# Patient Record
Sex: FEMALE | Race: WHITE | Employment: OTHER | URBAN - METROPOLITAN AREA
[De-identification: names, ages, dates, MRNs, and addresses within clinical notes are randomized per-mention and may not be internally consistent; named-entity substitution may affect disease eponyms.]

---

## 2017-04-14 PROBLEM — M16.12 PRIMARY OSTEOARTHRITIS OF LEFT HIP: Status: ACTIVE | Noted: 2017-04-14

## 2017-05-05 PROBLEM — G89.29 CHRONIC MIDLINE LOW BACK PAIN, WITH SCIATICA PRESENCE UNSPECIFIED: Status: ACTIVE | Noted: 2017-05-05

## 2017-05-05 PROBLEM — M54.5 CHRONIC MIDLINE LOW BACK PAIN, WITH SCIATICA PRESENCE UNSPECIFIED: Status: ACTIVE | Noted: 2017-05-05

## 2017-06-06 RX ORDER — BUTALBITAL, ACETAMINOPHEN AND CAFFEINE 50; 325; 40 MG/1; MG/1; MG/1
1 TABLET ORAL EVERY 6 HOURS PRN
COMMUNITY

## 2017-06-06 RX ORDER — NITROGLYCERIN 0.4 MG/1
0.4 TABLET SUBLINGUAL EVERY 5 MIN PRN
COMMUNITY

## 2017-06-06 RX ORDER — ACETAMINOPHEN 325 MG/1
325 TABLET ORAL EVERY 6 HOURS PRN
COMMUNITY

## 2017-06-06 RX ORDER — GLUCOSAMINE HCL 500 MG
TABLET ORAL
COMMUNITY

## 2017-06-06 RX ORDER — BUPRENORPHINE HCL 8 MG/1
1 TABLET SUBLINGUAL DAILY
COMMUNITY

## 2017-06-06 RX ORDER — SENNA AND DOCUSATE SODIUM 50; 8.6 MG/1; MG/1
2 TABLET, FILM COATED ORAL DAILY
COMMUNITY

## 2017-06-26 ENCOUNTER — APPOINTMENT (OUTPATIENT)
Dept: LAB | Facility: HOSPITAL | Age: 77
End: 2017-06-26
Attending: ORTHOPAEDIC SURGERY
Payer: MEDICARE

## 2017-06-26 ENCOUNTER — HOSPITAL ENCOUNTER (OUTPATIENT)
Dept: PHYSICAL THERAPY | Facility: HOSPITAL | Age: 77
Discharge: HOME | End: 2017-06-26
Attending: ORTHOPAEDIC SURGERY
Payer: MEDICARE

## 2017-06-26 DIAGNOSIS — Z01.818 PREOP EXAMINATION: Primary | ICD-10-CM

## 2017-06-26 DIAGNOSIS — M16.12 PRIMARY OSTEOARTHRITIS OF LEFT HIP: ICD-10-CM

## 2017-06-26 DIAGNOSIS — R73.01 IMPAIRED FASTING GLUCOSE: ICD-10-CM

## 2017-06-26 DIAGNOSIS — E66.09 EXOGENOUS OBESITY: ICD-10-CM

## 2017-06-26 DIAGNOSIS — E78.2 MIXED HYPERLIPIDEMIA: ICD-10-CM

## 2017-06-26 PROCEDURE — 86901 BLOOD TYPING SEROLOGIC RH(D): CPT

## 2017-06-26 PROCEDURE — 93005 ELECTROCARDIOGRAM TRACING: CPT

## 2017-06-26 PROCEDURE — 87081 CULTURE SCREEN ONLY: CPT

## 2017-06-26 PROCEDURE — 80053 COMPREHEN METABOLIC PANEL: CPT

## 2017-06-26 PROCEDURE — 80061 LIPID PANEL: CPT

## 2017-06-26 PROCEDURE — 84443 ASSAY THYROID STIM HORMONE: CPT

## 2017-06-26 PROCEDURE — 87088 URINE BACTERIA CULTURE: CPT

## 2017-06-26 PROCEDURE — 93010 ELECTROCARDIOGRAM REPORT: CPT | Performed by: INTERNAL MEDICINE

## 2017-06-26 PROCEDURE — 85025 COMPLETE CBC W/AUTO DIFF WBC: CPT

## 2017-06-26 PROCEDURE — 85610 PROTHROMBIN TIME: CPT

## 2017-06-26 PROCEDURE — 36415 COLL VENOUS BLD VENIPUNCTURE: CPT

## 2017-06-26 PROCEDURE — 87086 URINE CULTURE/COLONY COUNT: CPT

## 2017-06-26 PROCEDURE — 87186 SC STD MICRODIL/AGAR DIL: CPT

## 2017-06-26 PROCEDURE — 83036 HEMOGLOBIN GLYCOSYLATED A1C: CPT

## 2017-06-26 PROCEDURE — 85730 THROMBOPLASTIN TIME PARTIAL: CPT

## 2017-06-26 PROCEDURE — 86900 BLOOD TYPING SEROLOGIC ABO: CPT

## 2017-06-26 PROCEDURE — 81001 URINALYSIS AUTO W/SCOPE: CPT

## 2017-06-26 PROCEDURE — 86850 RBC ANTIBODY SCREEN: CPT

## 2017-07-09 ENCOUNTER — ANESTHESIA EVENT (OUTPATIENT)
Dept: SURGERY | Facility: HOSPITAL | Age: 77
DRG: 470 | End: 2017-07-09
Payer: MEDICARE

## 2017-07-10 ENCOUNTER — HOSPITAL ENCOUNTER (INPATIENT)
Facility: HOSPITAL | Age: 77
LOS: 2 days | Discharge: HOME HEALTH | DRG: 470 | End: 2017-07-12
Attending: ORTHOPAEDIC SURGERY | Admitting: ORTHOPAEDIC SURGERY
Payer: MEDICARE

## 2017-07-10 ENCOUNTER — SURGERY (OUTPATIENT)
Age: 77
End: 2017-07-10

## 2017-07-10 ENCOUNTER — ANESTHESIA (OUTPATIENT)
Dept: SURGERY | Facility: HOSPITAL | Age: 77
DRG: 470 | End: 2017-07-10
Payer: MEDICARE

## 2017-07-10 ENCOUNTER — APPOINTMENT (OUTPATIENT)
Dept: GENERAL RADIOLOGY | Facility: HOSPITAL | Age: 77
DRG: 470 | End: 2017-07-10
Attending: ORTHOPAEDIC SURGERY
Payer: MEDICARE

## 2017-07-10 DIAGNOSIS — M16.12 PRIMARY OSTEOARTHRITIS OF LEFT HIP: Primary | ICD-10-CM

## 2017-07-10 LAB
BILIRUB UR QL STRIP.AUTO: NEGATIVE
CLARITY UR REFRACT.AUTO: CLEAR
COLOR UR AUTO: YELLOW
GLUCOSE UR STRIP.AUTO-MCNC: NEGATIVE MG/DL
HYALINE CASTS #/AREA URNS AUTO: PRESENT /LPF
KETONES UR STRIP.AUTO-MCNC: NEGATIVE MG/DL
NITRITE UR QL STRIP.AUTO: NEGATIVE
PH UR STRIP.AUTO: 5 [PH] (ref 4.5–8)
PROT UR STRIP.AUTO-MCNC: NEGATIVE MG/DL
RBC UR QL AUTO: NEGATIVE
SP GR UR STRIP.AUTO: 1.02 (ref 1–1.03)
UROBILINOGEN UR STRIP.AUTO-MCNC: <2 MG/DL

## 2017-07-10 PROCEDURE — 73501 X-RAY EXAM HIP UNI 1 VIEW: CPT | Performed by: ORTHOPAEDIC SURGERY

## 2017-07-10 PROCEDURE — 88311 DECALCIFY TISSUE: CPT | Performed by: ORTHOPAEDIC SURGERY

## 2017-07-10 PROCEDURE — 87147 CULTURE TYPE IMMUNOLOGIC: CPT | Performed by: HOSPITALIST

## 2017-07-10 PROCEDURE — 76942 ECHO GUIDE FOR BIOPSY: CPT | Performed by: ORTHOPAEDIC SURGERY

## 2017-07-10 PROCEDURE — 97161 PT EVAL LOW COMPLEX 20 MIN: CPT

## 2017-07-10 PROCEDURE — 0SRB03Z REPLACEMENT OF LEFT HIP JOINT WITH CERAMIC SYNTHETIC SUBSTITUTE, OPEN APPROACH: ICD-10-PCS | Performed by: ORTHOPAEDIC SURGERY

## 2017-07-10 PROCEDURE — 88304 TISSUE EXAM BY PATHOLOGIST: CPT | Performed by: ORTHOPAEDIC SURGERY

## 2017-07-10 PROCEDURE — 94660 CPAP INITIATION&MGMT: CPT

## 2017-07-10 PROCEDURE — 97530 THERAPEUTIC ACTIVITIES: CPT

## 2017-07-10 PROCEDURE — 81001 URINALYSIS AUTO W/SCOPE: CPT | Performed by: HOSPITALIST

## 2017-07-10 PROCEDURE — 3E0T3CZ INTRODUCTION OF REGIONAL ANESTHETIC INTO PERIPHERAL NERVES AND PLEXI, PERCUTANEOUS APPROACH: ICD-10-PCS | Performed by: ANESTHESIOLOGY

## 2017-07-10 PROCEDURE — 87086 URINE CULTURE/COLONY COUNT: CPT | Performed by: HOSPITALIST

## 2017-07-10 DEVICE — PINNACLE HIP SOLUTIONS ALTRX POLYETHYLENE ACETABULAR LINER NEUTRAL 36MM ID 54MM OD
Type: IMPLANTABLE DEVICE | Site: HIP | Status: FUNCTIONAL
Brand: PINNACLE ALTRX

## 2017-07-10 DEVICE — BIOLOX DELTA CERAMIC FEMORAL HEAD +5.0 36MM DIA 12/14 TAPER
Type: IMPLANTABLE DEVICE | Site: HIP | Status: FUNCTIONAL
Brand: BIOLOX DELTA

## 2017-07-10 DEVICE — CORAIL HIP SYSTEM CEMENTLESS FEMORAL STEM 12/14 AMT 135 DEGREES KHO SIZE 11 HA COATED HIGH OFFSET NO COLLAR
Type: IMPLANTABLE DEVICE | Site: HIP | Status: FUNCTIONAL
Brand: CORAIL

## 2017-07-10 DEVICE — PINNACLE POROCOAT ACETABULAR SHELL SECTOR II 54MM OD
Type: IMPLANTABLE DEVICE | Site: HIP | Status: FUNCTIONAL
Brand: PINNACLE POROCOAT

## 2017-07-10 RX ORDER — HYDROMORPHONE HYDROCHLORIDE 1 MG/ML
0.4 INJECTION, SOLUTION INTRAMUSCULAR; INTRAVENOUS; SUBCUTANEOUS EVERY 2 HOUR PRN
Status: ACTIVE | OUTPATIENT
Start: 2017-07-10 | End: 2017-07-12

## 2017-07-10 RX ORDER — SODIUM PHOSPHATE, DIBASIC AND SODIUM PHOSPHATE, MONOBASIC 7; 19 G/133ML; G/133ML
1 ENEMA RECTAL ONCE AS NEEDED
Status: DISCONTINUED | OUTPATIENT
Start: 2017-07-10 | End: 2017-07-12

## 2017-07-10 RX ORDER — TRAMADOL HYDROCHLORIDE 50 MG/1
50 TABLET ORAL EVERY 6 HOURS
Status: DISCONTINUED | OUTPATIENT
Start: 2017-07-10 | End: 2017-07-10

## 2017-07-10 RX ORDER — SENNOSIDES 8.6 MG
17.2 TABLET ORAL NIGHTLY
Status: DISCONTINUED | OUTPATIENT
Start: 2017-07-10 | End: 2017-07-12

## 2017-07-10 RX ORDER — OXYCODONE HYDROCHLORIDE 5 MG/1
2.5 TABLET ORAL EVERY 4 HOURS PRN
Status: ACTIVE | OUTPATIENT
Start: 2017-07-10 | End: 2017-07-12

## 2017-07-10 RX ORDER — TRAMADOL HYDROCHLORIDE 50 MG/1
50 TABLET ORAL EVERY 6 HOURS PRN
Status: DISCONTINUED | OUTPATIENT
Start: 2017-07-10 | End: 2017-07-11

## 2017-07-10 RX ORDER — CLINDAMYCIN PHOSPHATE 900 MG/50ML
900 INJECTION INTRAVENOUS EVERY 8 HOURS
Status: COMPLETED | OUTPATIENT
Start: 2017-07-10 | End: 2017-07-10

## 2017-07-10 RX ORDER — ESCITALOPRAM OXALATE 20 MG/1
20 TABLET ORAL DAILY
Status: DISCONTINUED | OUTPATIENT
Start: 2017-07-10 | End: 2017-07-12

## 2017-07-10 RX ORDER — HYDROMORPHONE HYDROCHLORIDE 1 MG/ML
INJECTION, SOLUTION INTRAMUSCULAR; INTRAVENOUS; SUBCUTANEOUS
Status: COMPLETED
Start: 2017-07-10 | End: 2017-07-10

## 2017-07-10 RX ORDER — ACETAMINOPHEN 325 MG/1
650 TABLET ORAL 4 TIMES DAILY
Status: DISCONTINUED | OUTPATIENT
Start: 2017-07-10 | End: 2017-07-12

## 2017-07-10 RX ORDER — CYCLOBENZAPRINE HCL 5 MG
5 TABLET ORAL 3 TIMES DAILY PRN
Status: DISCONTINUED | OUTPATIENT
Start: 2017-07-10 | End: 2017-07-12

## 2017-07-10 RX ORDER — ONDANSETRON 2 MG/ML
4 INJECTION INTRAMUSCULAR; INTRAVENOUS EVERY 4 HOURS PRN
Status: DISCONTINUED | OUTPATIENT
Start: 2017-07-10 | End: 2017-07-12

## 2017-07-10 RX ORDER — OXYCODONE HCL 10 MG/1
10 TABLET, FILM COATED, EXTENDED RELEASE ORAL
Status: COMPLETED | OUTPATIENT
Start: 2017-07-10 | End: 2017-07-10

## 2017-07-10 RX ORDER — CLINDAMYCIN PHOSPHATE 900 MG/50ML
INJECTION INTRAVENOUS
Status: DISCONTINUED | OUTPATIENT
Start: 2017-07-10 | End: 2017-07-10

## 2017-07-10 RX ORDER — BISACODYL 10 MG
10 SUPPOSITORY, RECTAL RECTAL
Status: DISCONTINUED | OUTPATIENT
Start: 2017-07-10 | End: 2017-07-12

## 2017-07-10 RX ORDER — DIPHENHYDRAMINE HYDROCHLORIDE 50 MG/ML
12.5 INJECTION INTRAMUSCULAR; INTRAVENOUS EVERY 4 HOURS PRN
Status: DISCONTINUED | OUTPATIENT
Start: 2017-07-10 | End: 2017-07-11

## 2017-07-10 RX ORDER — METOCLOPRAMIDE HYDROCHLORIDE 5 MG/ML
10 INJECTION INTRAMUSCULAR; INTRAVENOUS EVERY 6 HOURS PRN
Status: ACTIVE | OUTPATIENT
Start: 2017-07-10 | End: 2017-07-12

## 2017-07-10 RX ORDER — MIDAZOLAM HYDROCHLORIDE 1 MG/ML
1 INJECTION INTRAMUSCULAR; INTRAVENOUS EVERY 5 MIN PRN
Status: DISCONTINUED | OUTPATIENT
Start: 2017-07-10 | End: 2017-07-10 | Stop reason: HOSPADM

## 2017-07-10 RX ORDER — DOCUSATE SODIUM 100 MG/1
100 CAPSULE, LIQUID FILLED ORAL 2 TIMES DAILY
Status: DISCONTINUED | OUTPATIENT
Start: 2017-07-10 | End: 2017-07-12

## 2017-07-10 RX ORDER — CLINDAMYCIN PHOSPHATE 900 MG/50ML
900 INJECTION INTRAVENOUS ONCE
Status: DISCONTINUED | OUTPATIENT
Start: 2017-07-10 | End: 2017-07-10 | Stop reason: HOSPADM

## 2017-07-10 RX ORDER — MAGNESIUM AMINO ACID CHELATE 100 MG
TABLET ORAL
COMMUNITY

## 2017-07-10 RX ORDER — ONDANSETRON 2 MG/ML
4 INJECTION INTRAMUSCULAR; INTRAVENOUS AS NEEDED
Status: DISCONTINUED | OUTPATIENT
Start: 2017-07-10 | End: 2017-07-10 | Stop reason: HOSPADM

## 2017-07-10 RX ORDER — DIPHENHYDRAMINE HCL 25 MG
25 CAPSULE ORAL EVERY 4 HOURS PRN
Status: DISCONTINUED | OUTPATIENT
Start: 2017-07-10 | End: 2017-07-11

## 2017-07-10 RX ORDER — ACETAMINOPHEN 325 MG/1
TABLET ORAL
Status: COMPLETED
Start: 2017-07-10 | End: 2017-07-10

## 2017-07-10 RX ORDER — HYDROCODONE BITARTRATE AND ACETAMINOPHEN 10; 325 MG/1; MG/1
1-2 TABLET ORAL EVERY 6 HOURS PRN
Qty: 80 TABLET | Refills: 0 | Status: SHIPPED | OUTPATIENT
Start: 2017-07-10 | End: 2017-07-12

## 2017-07-10 RX ORDER — OXYCODONE HYDROCHLORIDE 10 MG/1
10 TABLET ORAL EVERY 4 HOURS PRN
Status: DISCONTINUED | OUTPATIENT
Start: 2017-07-10 | End: 2017-07-11

## 2017-07-10 RX ORDER — ZOLPIDEM TARTRATE 5 MG/1
5 TABLET ORAL NIGHTLY PRN
Status: DISCONTINUED | OUTPATIENT
Start: 2017-07-10 | End: 2017-07-11

## 2017-07-10 RX ORDER — MIDAZOLAM HYDROCHLORIDE 1 MG/ML
INJECTION INTRAMUSCULAR; INTRAVENOUS
Status: COMPLETED
Start: 2017-07-10 | End: 2017-07-10

## 2017-07-10 RX ORDER — HYDROMORPHONE HYDROCHLORIDE 1 MG/ML
0.3 INJECTION, SOLUTION INTRAMUSCULAR; INTRAVENOUS; SUBCUTANEOUS EVERY 2 HOUR PRN
Status: ACTIVE | OUTPATIENT
Start: 2017-07-10 | End: 2017-07-12

## 2017-07-10 RX ORDER — ONDANSETRON 2 MG/ML
INJECTION INTRAMUSCULAR; INTRAVENOUS
Status: COMPLETED
Start: 2017-07-10 | End: 2017-07-10

## 2017-07-10 RX ORDER — SODIUM CHLORIDE, SODIUM LACTATE, POTASSIUM CHLORIDE, CALCIUM CHLORIDE 600; 310; 30; 20 MG/100ML; MG/100ML; MG/100ML; MG/100ML
INJECTION, SOLUTION INTRAVENOUS CONTINUOUS
Status: DISCONTINUED | OUTPATIENT
Start: 2017-07-10 | End: 2017-07-12

## 2017-07-10 RX ORDER — SODIUM CHLORIDE AND POTASSIUM CHLORIDE .9; .15 G/100ML; G/100ML
SOLUTION INTRAVENOUS CONTINUOUS
Status: DISCONTINUED | OUTPATIENT
Start: 2017-07-10 | End: 2017-07-12

## 2017-07-10 RX ORDER — ACETAMINOPHEN 325 MG/1
650 TABLET ORAL ONCE
Status: COMPLETED | OUTPATIENT
Start: 2017-07-10 | End: 2017-07-10

## 2017-07-10 RX ORDER — HYDROMORPHONE HYDROCHLORIDE 1 MG/ML
0.2 INJECTION, SOLUTION INTRAMUSCULAR; INTRAVENOUS; SUBCUTANEOUS EVERY 2 HOUR PRN
Status: ACTIVE | OUTPATIENT
Start: 2017-07-10 | End: 2017-07-12

## 2017-07-10 RX ORDER — POLYETHYLENE GLYCOL 3350 17 G/17G
17 POWDER, FOR SOLUTION ORAL DAILY PRN
Status: DISCONTINUED | OUTPATIENT
Start: 2017-07-10 | End: 2017-07-12

## 2017-07-10 RX ORDER — METOCLOPRAMIDE HYDROCHLORIDE 5 MG/ML
INJECTION INTRAMUSCULAR; INTRAVENOUS
Status: COMPLETED
Start: 2017-07-10 | End: 2017-07-10

## 2017-07-10 RX ORDER — HYDROMORPHONE HYDROCHLORIDE 1 MG/ML
0.4 INJECTION, SOLUTION INTRAMUSCULAR; INTRAVENOUS; SUBCUTANEOUS EVERY 5 MIN PRN
Status: DISCONTINUED | OUTPATIENT
Start: 2017-07-10 | End: 2017-07-10 | Stop reason: HOSPADM

## 2017-07-10 RX ORDER — DIPHENHYDRAMINE HYDROCHLORIDE 50 MG/ML
25 INJECTION INTRAMUSCULAR; INTRAVENOUS ONCE AS NEEDED
Status: ACTIVE | OUTPATIENT
Start: 2017-07-10 | End: 2017-07-10

## 2017-07-10 RX ORDER — NALOXONE HYDROCHLORIDE 0.4 MG/ML
80 INJECTION, SOLUTION INTRAMUSCULAR; INTRAVENOUS; SUBCUTANEOUS AS NEEDED
Status: DISCONTINUED | OUTPATIENT
Start: 2017-07-10 | End: 2017-07-10 | Stop reason: HOSPADM

## 2017-07-10 RX ORDER — ATENOLOL 25 MG/1
25 TABLET ORAL
Status: DISCONTINUED | OUTPATIENT
Start: 2017-07-11 | End: 2017-07-12

## 2017-07-10 RX ORDER — OXYCODONE HYDROCHLORIDE 5 MG/1
5 TABLET ORAL EVERY 4 HOURS PRN
Status: DISPENSED | OUTPATIENT
Start: 2017-07-10 | End: 2017-07-12

## 2017-07-10 RX ORDER — METOCLOPRAMIDE HYDROCHLORIDE 5 MG/ML
10 INJECTION INTRAMUSCULAR; INTRAVENOUS AS NEEDED
Status: DISCONTINUED | OUTPATIENT
Start: 2017-07-10 | End: 2017-07-10 | Stop reason: HOSPADM

## 2017-07-10 RX ORDER — OXYCODONE HCL 10 MG/1
10 TABLET, FILM COATED, EXTENDED RELEASE ORAL
Status: DISCONTINUED | OUTPATIENT
Start: 2017-07-10 | End: 2017-07-11

## 2017-07-10 RX ORDER — ATORVASTATIN CALCIUM 20 MG/1
20 TABLET, FILM COATED ORAL NIGHTLY
Status: DISCONTINUED | OUTPATIENT
Start: 2017-07-10 | End: 2017-07-12

## 2017-07-10 NOTE — OPERATIVE REPORT
TOTAL HIP REPLACEMENT OPERATIVE REPORT    Arden Aaron       [id number]     11/16/1940      TOTAL HIP REPLACEMENT  PRE-OP DX:  LEFT HIP PRIMARY OSTEOARTHRITIS  POST-OP DX:  LEFT HIP PRIMARY OSTEOARTHRITIS  PROCEDURE: LEFT  LATERAL TOTAL HIP REPLACEMENT ANTEVERSION. IT WAS A STABLE FIT. OSTEOPHYTE WAS REMOVED. TRIAL LINER WAS PLACED. FEMORAL EXPOSURE WAS OBTAINED WITH A TROCHANTERIC RETRACTOR AND MEDIAL NECK RETRACTOR. A STARTING AWL WAS USED TO FIND THE STARTING HOLE.   SEQUENTIAL BROACHING WAS PERFO

## 2017-07-10 NOTE — ANESTHESIA POSTPROCEDURE EVALUATION
[address] Patient Status:  Surgery Admit   Age/Gender 77year old female MRN [id number]   Location [address] Attending Adrian Mai MD   Hosp Day # 0 PCP Psychiatric       Anesthesia Post-op Note

## 2017-07-10 NOTE — PHYSICAL THERAPY NOTE
PHYSICAL THERAPY HIP EVALUATION - INPATIENT     Room Number: 362/362-A  Evaluation Date: 7/10/2017  Type of Evaluation: Initial  Physician Order: PT Eval and Treat    Presenting Problem: S/p Left Lateral JOSETTE on 07/10/17  Reason for Therapy: Mobility Dysfun self-stated goal is to be able to walk pain free.     OBJECTIVE  Precautions: JOSETTE - anterior  Fall Risk: High fall risk    WEIGHT BEARING RESTRICTION  Weight Bearing Restriction: L lower extremity           L Lower Extremity: Weight Bearing as Tolerated Score (AM-PAC Scale): 39.45   CMS Modifier (G-Code): CK    FUNCTIONAL ABILITY STATUS  Gait Assessment   Gait Assistance: Dependent assistance  Distance (ft): 6 steps by side of bed. Pain limited further ambulation  Assistive Device: Rolling walker  Pattern: gait skills + inability to manage stairs this time. The patient is below her baseline and would benefit from skilled inpatient PT to address the above deficits to assist patient in returning to prior level of function. The AM-PAC [facility]

## 2017-07-10 NOTE — H&P
[address] Patient Status:  Surgery Admit    1940 MRN [id number]   Location [address] [place] [address] Attending Sharyon Meckel, MD   Hosp Day # 0 PCP [facility]   Left hip pain    Hi Rfl:  Past Week at Unknown time   Senna-Docusate Sodium 8.6-50 MG Oral Tab Take 2 tablets by mouth daily. Disp:  Rfl:  7/7/2017   aspirin 81 MG Oral Tab Take 81 mg by mouth daily.  Disp:  Rfl:  Past Week at Unknown time   Cranberry 92679 MG Oral Cap Take by Resp 18   Ht 5' 4.5\" (1.638 m)   Wt 227 lb 4.7 oz (103.1 kg)   SpO2 95%   BMI 38.41 kg/m²   HEENT: Exam is unremarkable. Neck: No tenderness to palpitation. Lungs: Clear to auscultation bilaterally. Cardiac: Regular rate and rhythm. No murmur.   Abdom

## 2017-07-10 NOTE — CONSULTS
[place] Hospitalist Initial Consult       CC: post-op medical management s/p L JOSETTE    History of Present Illness: Patient is a 77year old female with PMH sig for HTN, HL who presents sp the above procedure.  She tolerated the procedure well without any immedia sodium chloride 0.9%, PEG 3350, magnesium hydroxide, bisacodyl, FLEET ENEMA, ondansetron HCl, Metoclopramide HCl, Prochlorperazine Edisylate, DiphenhydrAMINE HCl, diphenhydrAMINE **OR** DiphenhydrAMINE HCl, Zolpidem Tartrate    Allergies:    Penicillins meds    Recent UTI  - Pre-op UA grew enterococcus (not VRE). She reports she completed 5 day course of Abx. She doesn't know which med she took. Will repeat UA to ensure clearance.     Prevention: Xarelto, SCDs, bowel regimen      Outpatient records and pre

## 2017-07-10 NOTE — ANESTHESIA PREPROCEDURE EVALUATION
PRE-OP EVALUATION    Patient Name: Ricki Chapman    Pre-op Diagnosis: LEFT HIP OSTEOARTHRITIS    Procedure(s):  LEFT LATERAL TOTAL HIP REPLACEMENT    Surgeon(s) and Role:     Everton Bhatti MD - Primary    Pre-op vitals reviewed.   Temp: 98 °F (36.7 °C)  P of anesthetic complications         GI/Hepatic/Renal    Negative GI/hepatic/renal ROS. Cardiovascular      ECG reviewed.             (+) hypertension   (+) hyperlipidemia  (+) CAD    (+) CABG/stent            (-) CHF  (-) angina Pulmonary      Breath sounds clear to auscultation bilaterally. (-) rales     Other findings            ASA: 2   Plan: general and regional  NPO status verified and patient meets guidelines. Patient has taken beta blockers in last 24 hours.   Post

## 2017-07-11 LAB
ERYTHROCYTE [DISTWIDTH] IN BLOOD BY AUTOMATED COUNT: 12.2 % (ref 11.5–16)
HCT VFR BLD AUTO: 28.1 % (ref 34–50)
HGB BLD-MCNC: 9.5 G/DL (ref 12–16)
MCH RBC QN AUTO: 33.8 PG (ref 27–33.2)
MCHC RBC AUTO-ENTMCNC: 33.8 G/DL (ref 31–37)
MCV RBC AUTO: 100 FL (ref 81–100)
PLATELET # BLD AUTO: 193 10(3)UL (ref 150–450)
RBC # BLD AUTO: 2.81 X10(6)UL (ref 3.8–5.1)
RED CELL DISTRIBUTION WIDTH-SD: 44.6 FL (ref 35.1–46.3)
WBC # BLD AUTO: 9 X10(3) UL (ref 4–13)

## 2017-07-11 PROCEDURE — 97530 THERAPEUTIC ACTIVITIES: CPT

## 2017-07-11 PROCEDURE — 97150 GROUP THERAPEUTIC PROCEDURES: CPT

## 2017-07-11 PROCEDURE — 85027 COMPLETE CBC AUTOMATED: CPT | Performed by: ORTHOPAEDIC SURGERY

## 2017-07-11 PROCEDURE — 97116 GAIT TRAINING THERAPY: CPT

## 2017-07-11 PROCEDURE — 97165 OT EVAL LOW COMPLEX 30 MIN: CPT

## 2017-07-11 PROCEDURE — 97535 SELF CARE MNGMENT TRAINING: CPT

## 2017-07-11 RX ORDER — TRAMADOL HYDROCHLORIDE 50 MG/1
100 TABLET ORAL EVERY 4 HOURS PRN
Status: DISCONTINUED | OUTPATIENT
Start: 2017-07-11 | End: 2017-07-12

## 2017-07-11 RX ORDER — TRAMADOL HYDROCHLORIDE 50 MG/1
50 TABLET ORAL EVERY 4 HOURS PRN
Status: DISCONTINUED | OUTPATIENT
Start: 2017-07-11 | End: 2017-07-12

## 2017-07-11 NOTE — PROGRESS NOTES
Post Op Day 1 Ortho Note    Status Post Nerve Block:  Type of Nerve Block: Left Fascia Iliaca  Single Injection Nerve Block    Post op review: No evidence of immediate block related complications, No paresthesia noted, Able to plantar flex foot, Able to do

## 2017-07-11 NOTE — PHYSICAL THERAPY NOTE
PHYSICAL THERAPY HIP TREATMENT NOTE - INPATIENT      Room Number: 362/362-A     Session: 1  Number of Visits to Meet Established Goals: 5    Presenting Problem: S/p Left Lateral JOSETTE on 07/10/17    Problem List  Active Problems:    * No active hospital prob sitting on the side of the bed?: A Little   How much help from another person does the patient currently need. ..   -   Moving to and from a bed to a chair (including a wheelchair)?: A Little   -   Need to walk in hospital room?: [address],[place] [address] 3-5 Good  Frequency (Obs): BID    CURRENT GOALS  Goal #1  Patient is able to demonstrate supine - sit EOB @ level: supervision   Goal #2  Patient is able to demonstrate transfers Sit to/from Stand at assistance level: supervison   Goal #3  Patient is able to a

## 2017-07-11 NOTE — PROGRESS NOTES
[facility] Hospitalist Progress Note                                                                   [address]  11/16/1940    CC: FU post op    Interval History:  - Doing well today though w Recent Labs   Lab  07/11/17   0429   RBC  2.81*   HGB  9.5*   HCT  28.1*   MCV  100.0   MCH  33.8*   MCHC  33.8   RDW  12.2   WBC  9.0   PLT  193.0         ROS: no change to ROS from my documentation yesterday, except as otherwise noted in the Interval H

## 2017-07-11 NOTE — PHYSICAL THERAPY NOTE
PHYSICAL THERAPY HIP TREATMENT NOTE - INPATIENT      Room Number: 362/362-A     Session: 2 and 3  Number of Visits to Meet Established Goals: 5    Presenting Problem: S/p Left Lateral JOSETTE on 07/10/17    Problem List  Active Problems:    * No active hospita [place]   How much help from another person does the patient currently need. ..   -   Moving to and from a bed to a chair (including a wheelchair)?: A Little   -   Need to walk in hospital room?: A Little   -   Climbing 3-5 steps with a railing?: A Little assist.     Exercises AM Session PM Session   Ankle Pumps    10 reps 15 reps   Quad Sets 10 reps 15 reps   Glut Sets 10 reps 15 reps   Hip Abd/Add 10 reps 15 reps   Heel slides 10 reps 15 reps   Saq 10 reps 15 reps   Sitting Knee Extension 10 reps 15 reps supervision   Goal #2  Patient is able to demonstrate transfers Sit to/from Stand at assistance level: supervison   Goal #3  Patient is able to ambulate 150 feet with assistive device at assistance level:Supervision   Goal #4  Patient will negotiate 4 stai

## 2017-07-11 NOTE — PROGRESS NOTES
Orthopedic surgery progress note    Chet Mariscal Patient Status:  Inpatient    1940 MRN [id number]   [facility] 3SW-A Attending Lolly Reyes MD   Hosp Day # 1 PCP [facility], [facility]       Subjective:  No major complaints.   No calf pain,

## 2017-07-11 NOTE — OCCUPATIONAL THERAPY NOTE
OCCUPATIONAL THERAPY EVALUATION - INPATIENT     Room Number: 362/362-A  Evaluation Date: 7/11/2017  Type of Evaluation: Initial  Presenting Problem:  (L JOSETTE)    Physician Order: IP Consult to Occupational Therapy  Reason for Therapy: ADL/IADL Dysfunction a L Lower Extremity: Weight Bearing as Tolerated    PAIN ASSESSMENT  Rating: 3  Location:  (incision)  Management Techniques: Relaxation;Repositioning    COGNITION  Overall Cognitive Status:  WFL - within functional limits    VISION  Current Vision: wea needs at DC. She tolerated func mobility for approx 50 feet with slow pace and min VC for body mechanics, RW used. Patient End of Session: Up in chair;Needs met;Call light within reach;RN aware of session/findings; All patient questions and concerns addre training;Equipment eval/education; Compensatory technique education  Rehab Potential : Good  Frequency (Obs): Daily  Number of Visits to Meet Established Goals: 1    ADL Goals  Patient will perform grooming: with supervision and while standing at sink  [address] [place] [address]

## 2017-07-11 NOTE — PLAN OF CARE
PT A/O, IVF INFUSING, DRSG INTACT, ICE PK IN PLACE, 98% ON CPAP, USING IS, SR, HAD 6 BEATS OF VT DURING NIGHT, ASYMPTOMATIC, SCDS ON, VOIDING ON BSC, LABS THIS AM.  PAIN - ADULT    • Verbalizes/displays adequate comfort level or patient's stated pain goal

## 2017-07-11 NOTE — PAYOR COMM NOTE
--------------  ADMISSION REVIEW     Payor: [facility] Ajay Blevins [place] #:  [id number]  Authorization Number: [id number]    Admit date: 7/10/2017  5:02 AM       Admitting Physician: Iza Burr MD  Attending Physician:  Iza Burr MD  Primary A STANDARD LATERAL HIP INCISION WAS MADE. SHARP DISSECTION WAS MADE THROUGH THE SUBCUTANEOUS TISSUE FAT. FASCIAL LAYER WAS SHARPLY DIVIDED. GUTEUS MEDIUS WAS IDENTIFIED. C RETARCTOR WAS PLACED.   ANTERIOR 30% OF GLUTEUS MEDIUS WAS TAKEN DOWN IN [employer] ALL THE TRIAL IMPLANTS WERE REMOVED. WOUND WAS IRRIGATED COPIOUSLY. HEMOSTASIS WAS OBTAINED. ACETABULUM WAS REEXPOSED. REAL LINER WAS IMPACTED. LINER SEATING WAS VERIFIED. PROXIMAL FEMUR WAS EXPOSED.   REAL FEMORAL STEM WAS IMPACTED WITH GOOD STABILIT 7/11/2017 0641 Given 25 mg Oral Juan Carlos Anderson RN      atorvastatin (LIPITOR) tab 20 mg     Date Action Dose Route User    7/10/2017 2227 Given 20 mg Oral Juan Carlos Anderson RN      artificial saliva sustitute (BIOTENE) solution SOLN     Date Action Dos

## 2017-07-11 NOTE — RESPIRATORY THERAPY NOTE
[place] - Equipment Use Daily Summary:                  . Set Mode:                . Usage in hours:                . 90% Pressure (EPAP) level:                . 90% Insp. Pressure (IPAP): [place] AHI:                .  Supplemental Oxygen:

## 2017-07-12 VITALS
RESPIRATION RATE: 20 BRPM | WEIGHT: 227.31 LBS | BODY MASS INDEX: 38.34 KG/M2 | TEMPERATURE: 98 F | DIASTOLIC BLOOD PRESSURE: 44 MMHG | SYSTOLIC BLOOD PRESSURE: 120 MMHG | OXYGEN SATURATION: 92 % | HEIGHT: 64.5 IN | HEART RATE: 60 BPM

## 2017-07-12 LAB
ERYTHROCYTE [DISTWIDTH] IN BLOOD BY AUTOMATED COUNT: 12.4 % (ref 11.5–16)
HCT VFR BLD AUTO: 31 % (ref 34–50)
HGB BLD-MCNC: 10.2 G/DL (ref 12–16)
MCH RBC QN AUTO: 33.3 PG (ref 27–33.2)
MCHC RBC AUTO-ENTMCNC: 32.9 G/DL (ref 31–37)
MCV RBC AUTO: 101.3 FL (ref 81–100)
PLATELET # BLD AUTO: 220 10(3)UL (ref 150–450)
RBC # BLD AUTO: 3.06 X10(6)UL (ref 3.8–5.1)
RED CELL DISTRIBUTION WIDTH-SD: 45.9 FL (ref 35.1–46.3)
WBC # BLD AUTO: 10.5 X10(3) UL (ref 4–13)

## 2017-07-12 PROCEDURE — 97150 GROUP THERAPEUTIC PROCEDURES: CPT

## 2017-07-12 PROCEDURE — 97116 GAIT TRAINING THERAPY: CPT

## 2017-07-12 PROCEDURE — 97535 SELF CARE MNGMENT TRAINING: CPT

## 2017-07-12 PROCEDURE — 85027 COMPLETE CBC AUTOMATED: CPT | Performed by: ORTHOPAEDIC SURGERY

## 2017-07-12 RX ORDER — TRAMADOL HYDROCHLORIDE 50 MG/1
50 TABLET ORAL EVERY 4 HOURS PRN
Qty: 80 TABLET | Refills: 0 | Status: SHIPPED | OUTPATIENT
Start: 2017-07-12 | End: 2018-07-19 | Stop reason: ALTCHOICE

## 2017-07-12 NOTE — RESPIRATORY THERAPY NOTE
[place] - Equipment Use Daily Summary:                  . Set Mode: AUTO CPAP WITH C-FLEX                . Usage in hours: 6:18                . 90% Pressure (EPAP) level: 7.9                . 90% Insp. Pressure (IPAP): Kayla Kirby  AHI: 1.7

## 2017-07-12 NOTE — PROGRESS NOTES
Orthopedic surgery progress note    Francesca Brock Patient Status:  Inpatient    1940 MRN [id number]   [place] 3SW-A Attending Mariano Crawford MD   Hosp Day # 2 PCP [facility]       Subjective:  No major complaints.   No calf pain,

## 2017-07-12 NOTE — CM/SW NOTE
07/12/17 1500   Discharge disposition   Discharged to: Home-Health   Name of Facillity/Home Care/Hospice Residential   Discharge transportation Private car

## 2017-07-12 NOTE — HOME CARE LIAISON
MET WITH PTNT TO DISCUSS HOME HEALTH SERVICES AND COVERAGE CRITERIA. PTNT AGREEABLE TO Niraj Riggins. PTNT GIVEN RESIDENTIAL BROCHURE. RESIDENTIAL WITH PROVIDE SN/PT ON DISCHARGE.     Thank you for this referral,   Lala Oates

## 2017-07-12 NOTE — CM/SW NOTE
Pt Xarelto script sent electronically to pts [facility] in [address]. Needs prior authorization. Contacted [facility] at [phone] pts ID [id number] and spoke with Elvin Lamb.  Prior authorization submitted with ref # [id number], valid until Aug

## 2017-07-12 NOTE — OCCUPATIONAL THERAPY NOTE
OCCUPATIONAL THERAPY TREATMENT NOTE - INPATIENT     Room Number: 362/362-A  Session: 1   Number of Visits to Meet Established Goals: 1    Presenting Problem:  (L JOSETTE)    History related to current admission: Pt was admitted for an elective LTHA on 7/10, la grooming such as brushing teeth?: A Little  -   Eating meals?: None    AM-PAC Score:  Score: 19  Approx Degree of Impairment: 42.8%  Standardized Score (AM-PAC Scale): 40.22  CMS Modifier (G-Code): CK    FUNCTIONAL TRANSFER ASSESSMENT  Supine to Sit : Not Device Recommendations: Raised toilet seat;Grab bars; Shower chair; Other (Comment) (hip kit)    PLAN  OT Treatment Plan: Energy conservation/work simplification techniques;ADL training;Functional transfer training; Endurance training;Patient/Family education

## 2017-07-12 NOTE — PHYSICAL THERAPY NOTE
PHYSICAL THERAPY HIP TREATMENT NOTE - INPATIENT      Room Number: 362/362-A     Session: 4  Number of Visits to Meet Established Goals: 5    Presenting Problem: S/p Left Lateral JOSETTE on 07/10/17    Problem List  Active Problems:    Primary osteoarthritis of Little   How much help from another person does the patient currently need. ..   -   Moving to and from a bed to a chair (including a wheelchair)?: A Little   -   Need to walk in hospital room?: A Little   -   Climbing 3-5 steps with a railing?: A Little activity but continues to need frequent rest in between activities. Results of the AM-PAC \"6 clicks\" Inpatient Daily Mobility Short Form for the patient is 46.58% degree of basic mobility impairment.   The patient is below their baseline and would nadia

## 2017-07-12 NOTE — PLAN OF CARE
Pt discharge education completed with pt and CG. All questions addressed. All pt belongings packed and sent with pt. Discharged home with HHPT via personal car.

## 2017-07-12 NOTE — HOME CARE LIAISON
DIAGNOSES AND PERTINENT MEDICAL HISTORY:   LEFT HIP OA  .1KG   .8CM       HGBA1C  6/26/17  5.5    FACILITY NAME AND DC DATE: [facility]   7/12    BEDSIDE VISIT WITH: ADRIEN MET WITH PTNT AT BEDSIDE    SERVICES ORDERED:  SN/PT    VERIFIED PATIENT ADDRE

## 2017-07-12 NOTE — DISCHARGE SUMMARY
General Medicine Discharge Summary     Patient ID:  Alton Castillo  77year old  11/16/1940    Admit date: 7/10/2017    Discharge date and time: 7/12/17    Attending Physician: Sanjuanita Brown MD     Primary (szq=[id number])    Result Date: 7/10/2017  PROCEDURE:  XR HIP W OR WO PELVIS 1 VIEW, LEFT (CPT=73501)  TECHNIQUE:  Unilateral view of the hip. COMPARISON:  None.   INDICATIONS:  Hip arthroplasty  PATIENT STATED HISTORY: (As transcribed by Technologist)  Charbel 1200 mg + Vit D3 25 mcg 1 soft gel BID    atenolol 25 MG Oral Tab  Take 25 mg by mouth daily. Atorvastatin Calcium 20 MG Oral Tab  Take 20 mg by mouth nightly. escitalopram 10 MG Oral Tab  Take 20 mg by mouth daily.       Omega-3-acid Ethyl Esters

## 2017-07-12 NOTE — CM/SW NOTE
07/12/17 1200   CM/SW Referral Data   Referral Source Physician   Reason for Referral Discharge planning   Informant Patient;[facility] Staff   Pertinent Medical Hx   Primary Care Physician Name [id number]   Patient Info   Patient's Mental Status Alert;Welch with start of care for tomorrow at home.

## 2017-07-25 PROBLEM — Z96.642 HISTORY OF TOTAL HIP REPLACEMENT, LEFT: Status: ACTIVE | Noted: 2017-07-25

## (undated) DEVICE — SUTURE VICRYL 2-0 CP-1

## (undated) DEVICE — KENDALL SCD EXPRESS SLEEVES, KNEE LENGTH, MEDIUM: Brand: KENDALL SCD

## (undated) DEVICE — STERILE POLYISOPRENE POWDER-FREE SURGICAL GLOVES: Brand: PROTEXIS

## (undated) DEVICE — PILLOW ABDUCTION HIP SM

## (undated) DEVICE — HOOD, PEEL-AWAY: Brand: FLYTE

## (undated) DEVICE — DECANTER BAG 9": Brand: MEDLINE INDUSTRIES, INC.

## (undated) DEVICE — CONVERTORS STOCKINETTE: Brand: CONVERTORS

## (undated) DEVICE — BLADE ELECTRODE: Brand: EDGE

## (undated) DEVICE — 3M™ COBAN™ NL STERILE NON-LATEX SELF-ADHERENT WRAP, 2084S, 4 IN X 5 YD (10 CM X 4,5 M), 18 ROLLS/CASE: Brand: 3M™ COBAN™

## (undated) DEVICE — SPECIMEN CONTAINER,POSITIVE SEAL INDICATOR, OR PACKAGED: Brand: PRECISION

## (undated) DEVICE — INTENDED TO AID IN THE PASSING OF SUTURES THROUGH BONE AND SOFT TISSUE DURING ORTHOPEDIC SURGERY: Brand: HOFFEE SUTURE RETRIEVER

## (undated) DEVICE — Device: Brand: PLUMEPEN

## (undated) DEVICE — INSTRUMENT FEE

## (undated) DEVICE — Device: Brand: STABLECUT®

## (undated) DEVICE — GOWN SURG AERO CHROME XXL

## (undated) DEVICE — DRAPE,U/SHT,SPLIT,FILM,60X84,STERILE: Brand: MEDLINE

## (undated) DEVICE — WRAP COOLING HIP W/ICE PILLOW

## (undated) DEVICE — DRESSING AQUACEL AG 3.5 X 10

## (undated) DEVICE — TOTAL HIP CDS: Brand: MEDLINE INDUSTRIES, INC.

## (undated) DEVICE — STERILE HOOK LOCK LATEX FREE ELASTIC BANDAGE 6INX5YD: Brand: HOOK LOCK™

## (undated) DEVICE — MLPD DISPOSABLE PAD (6' ROLL) 3 ROLLS: Brand: SCHAERER MEDICAL USA

## (undated) DEVICE — SUTURE VICRYL 1 OS-6

## (undated) DEVICE — SUTURE ETHIBOND 5 CCS

## (undated) NOTE — IP AVS SNAPSHOT
Patient Demographics     Address  [place] [address] Phone  [phone] [facility]) *Preferred*  [phone] [facility])      Emergency Contact(s)     Name Relation Home Work Vance Baker   [phone]      Allergies as of 7/12/2017 any medical issues or to reconcile any change in home medications during hospital admission. Hip Replacement Discharge Instructions    Activity    Bathing  ?  No tub baths, pools, or saunas until cleared by surgeon (about 4-6 weeks because it takes t new dressing is changed daily and as needed. See instructions below. FOR MEDIPORE/COVERLET DRESSINGS:  Change dressing daily using Medipore/coverlet once Aquacel (waterproof) dressing is removed (which is about 7 days after surgery).  Patient should be s ? You may need pain medication regularly (every 4-6 hours) the first 2 weeks and then begin to decrease how often you are taking it. ? Take pain medication as prescribed with food, especially before therapy, allowing 30-60 minutes to take effect.   ? Do no ? Your surgeon may recommend that you take antibiotics before you undergo any dental or other invasive surgical procedures after your joint replacement. Speak with your physician about this at your post-op office visit.   ? Eat a balanced diet high in fiber ? Pain, excessive tenderness, redness, or swelling in leg or calf (other than incision site). Go directly to the ER or CALL 911 if  you:  ? become short of breath  ? have chest pain  ?  cough up blood  ? have unexplained anxiety with breathing Why:  Dr. Caprice Aguilar physician assistant  Contact information:  [address] [place] [address],[address]             Violet Madrigal. Schedule an appointment as soon as possible for a visit in 1 week.     Specialty:  [facility] in Commonly known as:  Devorah Garcia  Notes to patient:  Do not take until cleared by Dr. Donavan Pineda      Take 2 g by mouth 2 (two) times daily. rivaroxaban 10 MG Tabs  Commonly known as:  XARELTO      Take 1 tablet (10 mg total) by mouth daily.    Brooklynn Smith MD [id number] magnesium hydroxide (MILK OF MAGNESIA) 400 MG/5ML suspension 30 mL 07/12/17 0615 Given      [id number] ondansetron HCl (ZOFRAN) injection 4 mg 07/11/17 1427 Given      [id number] rivaroxaban (XARELTO) tab 10 mg 07/12/17 0615 Given 1714    Order Status:  Completed Lab Status:  Final result Updated:  07/12/17 1009    Specimen:  Urine from Urine, clean catch      Urine Culture <10,000 CFU/ML Gram positive heavenly      30,000 CFU/ML Streptococcus agalactiae (Group B beta strep) (A) Outpatient Meds:    No current facility-administered medications on file prior to encounter. Current Outpatient Prescriptions on File Prior to Encounter:  atenolol 25 MG Oral Tab Take 25 mg by mouth daily.    Disp:  Rfl:    Atorvastatin Calcium 20 MG Oral (Oral)   Resp 18   Ht 163.8 cm (5' 4.5\")   Wt 227 lb 4.7 oz (103.1 kg)   SpO2 98%   BMI 38.41 kg/m²[KT. 2]     Gen: No acute distress  Eyes: Pink conjunctivae, No ptosis  Neck: No masses, trachae midline  Pulm: Lungs clear bilaterally, normal respiratory e KT.3 - Ely Barton MD on 7/10/2017  3:13 PM                        Discharge Summaries - D/C Summary      Discharge Summaries signed by Munira Templeton DO at 7/12/2017 12:23 PM  Version 1 of 1    Author:  Munira Templeton DO Service:  (none) Han Baird Depression  - Home meds     Recent UTI  - Pre-op UA grew enterococcus (not VRE). She reports she completed 5 day course of Abx. She doesn't know which med she took.   - Repeat UA here on admit is negative- no further abx needed    Consults:[KM.1] IP CONSULT !! Multiple Vitamins-Minerals (CENTRUM SILVER) Oral Tab  Take 1 tablet by mouth daily. Senna-Docusate Sodium 8.6-50 MG Oral Tab  Take 2 tablets by mouth daily. Cranberry 48920 MG Oral Cap  Take by mouth 2 (two) times daily.     acetaminophen 325 MG Or Author:  Aileen Liang PTA Service:  Rehab Author Type:  Physical Therapy Assistant    Filed:  7/12/2017 12:28 PM Date of Service:  7/12/2017 12:22 PM Status:  Signed    :  Aileen Liang PTA (Physical Therapy Assistant)       PHYSICAL TH -   Turning over in bed (including adjusting bedclothes, sheets and blankets)?: A Little   -   Sitting down on and standing up from a chair with arms (e.g., wheelchair, bedside commode, etc.): A Little   -   Moving from lying on back to sitting on the side  was present yes   is a therapist and caregiver present. ASSESSMENT   Pt seen QD in PT group for gait training, stair/curb step training, and BLE strengthening: S/P R lateral JOSETTE. Pt able to recall 3/3 hip precautions.  Pt with concerns independently   Goal #6        Goal Comments: Goals established on 7/10/2017,Ongoing 07/12/17     Attribution Key    Attribution information is not available for this note.              Physical Therapy Note signed by Jorge Alberto Jeter PTA at 7/1 Location: Left hip @ surgical site  Management Techniques: Activity promotion; Body mechanics;Breathing techniques;Relaxation;Repositioning    BALANCE  Static Sitting: Good  Dynamic Sitting: Fair +  Static Standing: Poor +  Dynamic Standing: Poor +  ACTIVIT with RW at [facility], forward flexed, shuffling pattern. Pt was too fatigued after stair training and refused to amb further. Pt encouraged to perform ankle pumps for decreased inflammation/improved circulation.   Pt able to perform stair training with use of one continued IP PT, so that patient may achieve highest functional independence/return to baseline. Recommend home with HHPT and caregiver upon [facility] d/c. Pt's caregiver present during both sessions.    DISCHARGE RECOMMENDATIONS  PT Discharge Recomme Active Problems:    * No active hospital problems.  *      Past Medical History   Past Medical History:   Diagnosis Date   • Back problem    • Constipation    • Coronary atherosclerosis    • High blood pressure    • High cholesterol    • Incontinence     In -   Climbing 3-5 steps with a railing?: A Little       AM-PAC Score:  Raw Score: 17   PT Approx Degree of Impairment Score: 50.57%   Standardized Score (AM-PAC Scale): 42.13   CMS Modifier (G-Code): CK    FUNCTIONAL ABILITY STATUS  Gait Assessment   Gait A precautions. Pt was educated on hip precautions. Results of the AM-PAC \"6 clicks\" Inpatient Daily Mobility Short Form for the patient is 50.57% degree of basic mobility impairment.   The patient is below their baseline and would benefit from continued Author:  Sonja Lind PT Service:  Rehab Author Type:  Physical Therapist    Filed:  7/11/2017 11:25 AM Date of Service:  7/11/2017  9:00 AM Status:  Signed    :  Sonja Lind PT (Physical Therapist)       PHYSICAL THERAPY HIP TREATMENT NO AM-PAC '6-Clicks' INPATIENT SHORT FORM - BASIC MOBILITY  How much difficulty does the patient currently have. .. [NP.1]  -   Turning over in bed (including adjusting bedclothes, sheets and blankets)?: A Lot   -   Sitting down on and standing up from a chair oDrie Herrera was present & was educated in how to assist @ home. Goals are in progress.      DISCHARGE RECOMMENDATIONS[NP.1]  PT Discharge Recommendations: Home with home health PT (Has hired care giver for next 2 weeks to assist during recov)[NP.2]    PLAN[NP.1] Active Problems:    * No active hospital problems.  *      Past Medical History  Past Medical History:   Diagnosis Date   • Back problem    • Constipation    • Coronary atherosclerosis    • High blood pressure    • High cholesterol    • Incontinence     Inc Management Techniques: Activity promotion; Body mechanics;Breathing techniques;Relaxation;Repositioning    COGNITION  · Overall Cognitive Status:  WFL - within functional limits  · Arousal/Alertness:  delayed responses to stimuli    RANGE OF MOTION AND STRE Comment : Above score is based on FIM definations    Skilled Therapy Provided: Evaluation completed. Patient was instructed & educated in post surgical precautions & weight bearing status. Issued handouts for precautions & reviewed multiple times during this patient is demonstrating a  57.7 % degree of impairment in mobility. Research supports that patients with this level of impairment may benefit from  Home P.T. + 24 hours supervision & assist for safety,.        DISCHARGE RECOMMENDATIONS  PT Discharge Recomm History related to current admission: Pt was admitted for an elective LTHA on 7/10, lateral approach     Problem List  Active Problems:    * No active hospital problems.  *      Past Medical History  Past Medical History:   Diagnosis Date   • Back problem CMS Modifier (G-Code): CK    FUNCTIONAL TRANSFER ASSESSMENT  Supine to Sit : Not tested  Sit to Stand: Minimum assistance    Skilled Therapy Provided: Pt was up in chair on arrival and ready and willing to participate in therapy.   Pt was educated on safety training;Functional transfer training; Endurance training;Patient/Family education;Patient/Family training;Equipment eval/education; Compensatory technique education  Rehab Potential : Good  Frequency (Obs): Daily      ADL Goals  Patient will perform groomin Incontinent of urine only. Wears pad all the time.    • Migraines    • Osteoarthritis     left hip   • Prediabetes    • Sleep apnea    • Visual impairment     glasses       Past Surgical History  Past Surgical History:  No date: DILATION/CURETTAGE,DIAGNOST Other Test(s): MBI 13/20            NEUROLOGICAL FINDINGS                   ACTIVITY TOLERANCE  Room air  No shortness of breath    ACTIVITIES OF DAILY LIVING ASSESSMENT  AM-PAC ‘6-Clicks’ Inpatient Daily Activity Short Form  How much pain.  These deficits manifest functionally while performing self care and functional mobility.   The patient is below baseline and would benefit from skilled inpatient OT to address the above deficits, maximizing patient's ability to return to prior level Additional Goals:  Patient will recall and incorporate hip precautions into ADLs. [MM.1]      Attribution Key    MM. 1 - Fracisco Camacho OT on 7/11/2017 11:38 AM                     Video Swallow Study Notes     No notes of this type exist for this encoun